# Patient Record
Sex: FEMALE | URBAN - METROPOLITAN AREA
[De-identification: names, ages, dates, MRNs, and addresses within clinical notes are randomized per-mention and may not be internally consistent; named-entity substitution may affect disease eponyms.]

---

## 2019-06-05 ENCOUNTER — OFFICE VISIT (OUTPATIENT)
Dept: URBAN - METROPOLITAN AREA CLINIC 58 | Facility: CLINIC | Age: 59
End: 2019-06-05
Payer: COMMERCIAL

## 2019-06-05 DIAGNOSIS — H52.4 PRESBYOPIA: ICD-10-CM

## 2019-06-05 DIAGNOSIS — H25.013 CORTICAL AGE-RELATED CATARACT, BILATERAL: ICD-10-CM

## 2019-06-05 DIAGNOSIS — H02.886 MEIBOMIAN GLAND DYSFUNCTION OF LEFT EYE, UNSPECIFIED EYELID: ICD-10-CM

## 2019-06-05 DIAGNOSIS — H02.883 MEIBOMIAN GLAND DYSFUNCTION OF RIGHT EYE, UNSPECIFIED EYELID: Primary | ICD-10-CM

## 2019-06-05 DIAGNOSIS — H40.033 ANATOMICAL NARROW ANGLE, BILATERAL: ICD-10-CM

## 2019-06-05 PROCEDURE — 99204 OFFICE O/P NEW MOD 45 MIN: CPT | Performed by: OPTOMETRIST

## 2019-06-05 ASSESSMENT — INTRAOCULAR PRESSURE
OD: 18
OS: 19

## 2019-06-05 ASSESSMENT — VISUAL ACUITY
OS: 20/25
OD: 20/20

## 2019-06-05 ASSESSMENT — KERATOMETRY
OD: 42.50
OS: 43.13

## 2019-06-05 NOTE — IMPRESSION/PLAN
Impression: Cortical age-related cataract, bilateral: H25.013. Plan: Discussed diagnosis in detail with patient. No treatment is required at this time. Will continue to observe condition and or symptoms. Call if [place] worsens.

## 2019-06-05 NOTE — IMPRESSION/PLAN
Impression: Meibomian gland dysfunction of right eye, unspecified eyelid: H02.883. Plan: Diagnosis discussed, explained that symptoms are caused by MGD. Instructed to apply hot compresses daily. Instructed use of AT's 3 to 4 times a day. Call if worse.

## 2019-06-05 NOTE — IMPRESSION/PLAN
Impression: Anatomical narrow angle, bilateral: H40.033. Plan: Narrow angles, intraocular pressure stable with no evidence of glaucomatous damage. Will continue to monitor.